# Patient Record
Sex: FEMALE | Race: WHITE | NOT HISPANIC OR LATINO | Employment: PART TIME | URBAN - METROPOLITAN AREA
[De-identification: names, ages, dates, MRNs, and addresses within clinical notes are randomized per-mention and may not be internally consistent; named-entity substitution may affect disease eponyms.]

---

## 2020-10-02 ENCOUNTER — TRANSCRIBE ORDERS (OUTPATIENT)
Dept: ADMINISTRATIVE | Facility: HOSPITAL | Age: 40
End: 2020-10-02

## 2020-10-02 DIAGNOSIS — N63.20 MASS OF LEFT BREAST: Primary | ICD-10-CM

## 2020-10-23 ENCOUNTER — HOSPITAL ENCOUNTER (OUTPATIENT)
Dept: ULTRASOUND IMAGING | Facility: HOSPITAL | Age: 40
Discharge: HOME | End: 2020-10-23

## 2020-10-23 ENCOUNTER — HOSPITAL ENCOUNTER (OUTPATIENT)
Dept: MAMMOGRAPHY | Facility: HOSPITAL | Age: 40
Discharge: HOME | End: 2020-10-23

## 2020-10-23 ENCOUNTER — TRANSCRIBE ORDERS (OUTPATIENT)
Dept: MAMMOGRAPHY | Facility: HOSPITAL | Age: 40
End: 2020-10-23

## 2020-10-23 DIAGNOSIS — R92.8 ABNORMAL MAMMOGRAM: Primary | ICD-10-CM

## 2020-10-23 DIAGNOSIS — N63.20 MASS OF LEFT BREAST: ICD-10-CM

## 2020-10-23 PROCEDURE — G0279 TOMOSYNTHESIS, MAMMO: HCPCS

## 2020-10-23 PROCEDURE — 77066 DX MAMMO INCL CAD BI: CPT

## 2020-10-23 PROCEDURE — 76642 ULTRASOUND BREAST LIMITED: CPT

## 2020-10-23 PROCEDURE — 76641 ULTRASOUND BREAST COMPLETE: CPT | Performed by: RADIOLOGY

## 2020-10-23 PROCEDURE — 77062 BREAST TOMOSYNTHESIS BI: CPT | Performed by: RADIOLOGY

## 2020-10-23 PROCEDURE — 77066 DX MAMMO INCL CAD BI: CPT | Performed by: RADIOLOGY

## 2020-11-20 ENCOUNTER — HOSPITAL ENCOUNTER (OUTPATIENT)
Dept: ULTRASOUND IMAGING | Facility: HOSPITAL | Age: 40
Discharge: HOME | End: 2020-11-20

## 2020-11-20 ENCOUNTER — HOSPITAL ENCOUNTER (OUTPATIENT)
Dept: MAMMOGRAPHY | Facility: HOSPITAL | Age: 40
Discharge: HOME | End: 2020-11-20

## 2020-11-20 DIAGNOSIS — R92.8 ABNORMAL MAMMOGRAM: ICD-10-CM

## 2020-11-20 PROCEDURE — 25010000003 LIDOCAINE 1 % SOLUTION: Performed by: RADIOLOGY

## 2020-11-20 PROCEDURE — A4648 IMPLANTABLE TISSUE MARKER: HCPCS

## 2020-11-20 PROCEDURE — 77065 DX MAMMO INCL CAD UNI: CPT | Performed by: RADIOLOGY

## 2020-11-20 PROCEDURE — 19083 BX BREAST 1ST LESION US IMAG: CPT | Performed by: RADIOLOGY

## 2020-11-20 PROCEDURE — 88305 TISSUE EXAM BY PATHOLOGIST: CPT | Performed by: NURSE PRACTITIONER

## 2020-11-20 RX ORDER — LIDOCAINE HYDROCHLORIDE 10 MG/ML
5 INJECTION, SOLUTION INFILTRATION; PERINEURAL ONCE
Status: DISCONTINUED | OUTPATIENT
Start: 2020-11-20 | End: 2021-05-10

## 2020-11-20 RX ORDER — LIDOCAINE HYDROCHLORIDE AND EPINEPHRINE 10; 10 MG/ML; UG/ML
10 INJECTION, SOLUTION INFILTRATION; PERINEURAL ONCE
Status: COMPLETED | OUTPATIENT
Start: 2020-11-20 | End: 2020-11-20

## 2020-11-20 RX ORDER — LIDOCAINE HYDROCHLORIDE 10 MG/ML
5 INJECTION, SOLUTION INFILTRATION; PERINEURAL ONCE
Status: COMPLETED | OUTPATIENT
Start: 2020-11-20 | End: 2020-11-20

## 2020-11-20 RX ADMIN — LIDOCAINE HYDROCHLORIDE,EPINEPHRINE BITARTRATE 1 ML: 10; .01 INJECTION, SOLUTION INFILTRATION; PERINEURAL at 15:30

## 2020-11-20 RX ADMIN — LIDOCAINE HYDROCHLORIDE 2 ML: 10 INJECTION, SOLUTION INFILTRATION; PERINEURAL at 15:30

## 2020-11-20 NOTE — PROGRESS NOTES
Alert and orientated. Denies discomfort. No active bleeding. Steri-strips not visualized, gauze dressing intact. Ice packs given. Verbalizes and demonstrates understanding of post-care instructions, written copy given.  Pt verbalized understanding.         Cigarettes

## 2020-11-23 LAB
CYTO UR: NORMAL
LAB AP CASE REPORT: NORMAL
LAB AP CLINICAL INFORMATION: NORMAL
LAB AP DIAGNOSIS COMMENT: NORMAL
PATH REPORT.FINAL DX SPEC: NORMAL
PATH REPORT.GROSS SPEC: NORMAL

## 2020-11-24 ENCOUNTER — TELEPHONE (OUTPATIENT)
Dept: MAMMOGRAPHY | Facility: HOSPITAL | Age: 40
End: 2020-11-24

## 2020-11-24 NOTE — TELEPHONE ENCOUNTER
Pt notified of pathology results and recommendations. Verbalizes understanding. Denies discomfort. Denies signs and symptoms of infection.     Pt desires to research surgeon choice. Pt is to call back with decision; an appointment will then be scheduled and she will be notified. Verbalizes understanding.

## 2020-11-25 ENCOUNTER — TELEPHONE (OUTPATIENT)
Dept: MAMMOGRAPHY | Facility: HOSPITAL | Age: 40
End: 2020-11-25

## 2020-11-25 NOTE — TELEPHONE ENCOUNTER
Pt called in, requests Dr LAWRENCE Green for surgical consult. Pt is aware [facility] office is closed until 11/30/20.  Pt will be notified when an appointment is scheduled. Verbalized understanding.

## 2020-11-30 ENCOUNTER — TELEPHONE (OUTPATIENT)
Dept: MAMMOGRAPHY | Facility: HOSPITAL | Age: 40
End: 2020-11-30

## 2020-11-30 NOTE — TELEPHONE ENCOUNTER
Pt notified of surgical consult appointment with Dr.KB Green on 12.10.20 @ 0900/0995. Pt given office contact & location information. Told to bring photo ID, list of prescription & OTC medications, insurance information, must wear a mask & come to visit alone unless assistance is needed. Encouraged pt to call back or contact surgeon's office with further questions. Pt verbalized understanding.

## 2020-12-28 ENCOUNTER — APPOINTMENT (OUTPATIENT)
Dept: PREADMISSION TESTING | Facility: HOSPITAL | Age: 40
End: 2020-12-28

## 2020-12-28 LAB — SARS-COV-2 RNA RESP QL NAA+PROBE: NOT DETECTED

## 2020-12-28 PROCEDURE — U0004 COV-19 TEST NON-CDC HGH THRU: HCPCS

## 2020-12-28 PROCEDURE — C9803 HOPD COVID-19 SPEC COLLECT: HCPCS

## 2020-12-30 ENCOUNTER — LAB REQUISITION (OUTPATIENT)
Dept: LAB | Facility: HOSPITAL | Age: 40
End: 2020-12-30

## 2020-12-30 DIAGNOSIS — N63.42 UNSPECIFIED LUMP IN LEFT BREAST, SUBAREOLAR: ICD-10-CM

## 2020-12-30 PROCEDURE — 88307 TISSUE EXAM BY PATHOLOGIST: CPT | Performed by: SURGERY

## 2021-01-04 LAB
CYTO UR: NORMAL
LAB AP CASE REPORT: NORMAL
LAB AP CLINICAL INFORMATION: NORMAL
PATH REPORT.FINAL DX SPEC: NORMAL
PATH REPORT.GROSS SPEC: NORMAL

## 2021-05-10 ENCOUNTER — OFFICE VISIT (OUTPATIENT)
Dept: INTERNAL MEDICINE | Facility: CLINIC | Age: 41
End: 2021-05-10

## 2021-05-10 VITALS
BODY MASS INDEX: 22.74 KG/M2 | HEIGHT: 60 IN | SYSTOLIC BLOOD PRESSURE: 108 MMHG | WEIGHT: 115.8 LBS | DIASTOLIC BLOOD PRESSURE: 68 MMHG | OXYGEN SATURATION: 99 % | HEART RATE: 50 BPM | TEMPERATURE: 97.7 F

## 2021-05-10 DIAGNOSIS — Z13.0 SCREENING FOR ENDOCRINE, METABOLIC AND IMMUNITY DISORDER: ICD-10-CM

## 2021-05-10 DIAGNOSIS — Z13.228 SCREENING FOR ENDOCRINE, METABOLIC AND IMMUNITY DISORDER: ICD-10-CM

## 2021-05-10 DIAGNOSIS — Z13.29 SCREENING FOR ENDOCRINE, METABOLIC AND IMMUNITY DISORDER: ICD-10-CM

## 2021-05-10 DIAGNOSIS — Z23 NEED FOR TDAP VACCINATION: ICD-10-CM

## 2021-05-10 DIAGNOSIS — D69.6 THROMBOCYTOPENIA (HCC): ICD-10-CM

## 2021-05-10 DIAGNOSIS — G89.29 CHRONIC LEFT HIP PAIN: ICD-10-CM

## 2021-05-10 DIAGNOSIS — R00.2 PALPITATIONS: ICD-10-CM

## 2021-05-10 DIAGNOSIS — Z76.89 ENCOUNTER TO ESTABLISH CARE: Primary | ICD-10-CM

## 2021-05-10 DIAGNOSIS — R00.1 BRADYCARDIA: ICD-10-CM

## 2021-05-10 DIAGNOSIS — M25.552 CHRONIC LEFT HIP PAIN: ICD-10-CM

## 2021-05-10 DIAGNOSIS — Z13.0 SCREENING FOR DISORDER OF BLOOD AND BLOOD-FORMING ORGANS: ICD-10-CM

## 2021-05-10 DIAGNOSIS — I34.1 MITRAL VALVE PROLAPSE: ICD-10-CM

## 2021-05-10 DIAGNOSIS — R53.83 FATIGUE, UNSPECIFIED TYPE: ICD-10-CM

## 2021-05-10 PROBLEM — F41.9 ANXIETY: Status: ACTIVE | Noted: 2021-05-10

## 2021-05-10 LAB
25(OH)D3+25(OH)D2 SERPL-MCNC: 44.2 NG/ML (ref 30–100)
ALBUMIN SERPL-MCNC: 5 G/DL (ref 3.5–5.2)
ALBUMIN/GLOB SERPL: 2.4 G/DL
ALP SERPL-CCNC: 89 U/L (ref 39–117)
ALT SERPL-CCNC: 13 U/L (ref 1–33)
AST SERPL-CCNC: 15 U/L (ref 1–32)
BASOPHILS # BLD AUTO: 0.01 10*3/MM3 (ref 0–0.2)
BASOPHILS NFR BLD AUTO: 0.2 % (ref 0–1.5)
BILIRUB SERPL-MCNC: 0.2 MG/DL (ref 0–1.2)
BUN SERPL-MCNC: 17 MG/DL (ref 6–20)
BUN/CREAT SERPL: 16.8 (ref 7–25)
CALCIUM SERPL-MCNC: 9.6 MG/DL (ref 8.6–10.5)
CHLORIDE SERPL-SCNC: 102 MMOL/L (ref 98–107)
CO2 SERPL-SCNC: 27.6 MMOL/L (ref 22–29)
CREAT SERPL-MCNC: 1.01 MG/DL (ref 0.57–1)
EOSINOPHIL # BLD AUTO: 0.08 10*3/MM3 (ref 0–0.4)
EOSINOPHIL NFR BLD AUTO: 1.5 % (ref 0.3–6.2)
ERYTHROCYTE [DISTWIDTH] IN BLOOD BY AUTOMATED COUNT: 14.2 % (ref 12.3–15.4)
GLOBULIN SER CALC-MCNC: 2.1 GM/DL
GLUCOSE SERPL-MCNC: 86 MG/DL (ref 65–99)
HCT VFR BLD AUTO: 36.8 % (ref 34–46.6)
HGB BLD-MCNC: 12 G/DL (ref 12–15.9)
IMM GRANULOCYTES # BLD AUTO: 0.01 10*3/MM3 (ref 0–0.05)
IMM GRANULOCYTES NFR BLD AUTO: 0.2 % (ref 0–0.5)
LYMPHOCYTES # BLD AUTO: 1.42 10*3/MM3 (ref 0.7–3.1)
LYMPHOCYTES NFR BLD AUTO: 27.2 % (ref 19.6–45.3)
MCH RBC QN AUTO: 27.1 PG (ref 26.6–33)
MCHC RBC AUTO-ENTMCNC: 32.6 G/DL (ref 31.5–35.7)
MCV RBC AUTO: 83.3 FL (ref 79–97)
MONOCYTES # BLD AUTO: 0.38 10*3/MM3 (ref 0.1–0.9)
MONOCYTES NFR BLD AUTO: 7.3 % (ref 5–12)
NEUTROPHILS # BLD AUTO: 3.33 10*3/MM3 (ref 1.7–7)
NEUTROPHILS NFR BLD AUTO: 63.6 % (ref 42.7–76)
NRBC BLD AUTO-RTO: 0 /100 WBC (ref 0–0.2)
PLATELET # BLD AUTO: 146 10*3/MM3 (ref 140–450)
POTASSIUM SERPL-SCNC: 3.8 MMOL/L (ref 3.5–5.2)
PROT SERPL-MCNC: 7.1 G/DL (ref 6–8.5)
RBC # BLD AUTO: 4.42 10*6/MM3 (ref 3.77–5.28)
SODIUM SERPL-SCNC: 138 MMOL/L (ref 136–145)
TSH SERPL DL<=0.005 MIU/L-ACNC: 1.33 UIU/ML (ref 0.27–4.2)
WBC # BLD AUTO: 5.23 10*3/MM3 (ref 3.4–10.8)

## 2021-05-10 PROCEDURE — 90715 TDAP VACCINE 7 YRS/> IM: CPT | Performed by: NURSE PRACTITIONER

## 2021-05-10 PROCEDURE — 99204 OFFICE O/P NEW MOD 45 MIN: CPT | Performed by: NURSE PRACTITIONER

## 2021-05-10 PROCEDURE — 90471 IMMUNIZATION ADMIN: CPT | Performed by: NURSE PRACTITIONER

## 2021-05-10 NOTE — PROGRESS NOTES
"  Date: 05/10/2021    Name: Oralia Persaud  : 1980    Chief Complaint:   Chief Complaint   Patient presents with   • [facility]       HPI:  Oralia Persaud is a 41 y.o. female presents to establish care.    She has been an established patient in this clinic in the past, most recently in 2016.  She has a history of mitral valve prolapse, palpitations.  Has been evaluated by cardiology in the past.  Initially seen by Dr. Bowles, who advised her she needed a pacemaker.  She requested a second opinion and was evaluated by 2 separate cardiologists, one of whom was Dr Davis.  She reports she was told she did not need a pacemaker, has not followed up with primary care or cardiologist since that time.  Over the past few months, has noted a feeling that her \"heart is struggling to stay in rhythm\" after exercise.  She has also been having left hip pain with running, her preferred exercise.  Due these symptoms, she has stopped exercising.  She has been feeling fatigued, overall.  Denies current chest pain, dyspnea, orthopnea, lower extremity swelling, headache, vision changes, dizziness, confusion, syncope.  No current palpitations.    Fatigue is constant.  She has a h/o thrombocytopenia.  No bleeding when she brushes her teeth, hematuria, blood in stool.  She does bruise easily, when on her feet all day her knees are bruised.  States this is normal for her. Denies weight change, temperature intolerance, hair/skin/nail changes, bowel habit changes, anxiety, sore throat, hoarseness.   Eats a healthy diet.  Works as a  at a [employer], part time.  Has 3 sons, aged 11-17.    Left hip pain was noted after a strenuous workout about a year ago, she does not recall a specific injury. Went to visit family the day after pain began, she rested the hip and pain slightly improved.  She has never had it evaluated, has been stretching the hip.  Pain elicited with treadmill, running.  Pain does not radiate into lower leg, " "knee.  Hip does pop with movement.         History:  LMP: 21  Last pap date: 2020  Abnormal pap? yes, with repeat testing normal.  : 3  Para: 3    Do you take any herbs or supplements that were not prescribed by a doctor? yes, MVN a couple of times a week, vit d daily except on days she takes MVN    Health Habits:  Dental Exam. up to date  Eye Exam. up to date, wears contacts.   Exercise: 0 times/week.  Current exercise activities include: none  History:    Past Medical History:   Diagnosis Date   • Mitral valve prolapse    • Thrombocytopenia (CMS/HCC)        Past Surgical History:   Procedure Laterality Date   • BREAST SURGERY         Family History   Problem Relation Age of Onset   • Hyperlipidemia Mother    • Diabetes Sister    • Diabetes Maternal Aunt        Social History     Socioeconomic History   • Marital status:      Spouse name: Not on file   • Number of children: Not on file   • Years of education: Not on file   • Highest education level: Not on file   Tobacco Use   • Smoking status: Never Smoker   • Smokeless tobacco: Never Used   Substance and Sexual Activity   • Alcohol use: No   • Drug use: No       No Known Allergies      Current Outpatient Medications:   •  Cholecalciferol (VITAMIN D) 1000 UNITS tablet, Take 1 tablet by mouth 2 (two) times a day., Disp: , Rfl:   No current facility-administered medications for this visit.    VS:  Vitals:    05/10/21 0828   BP: 108/68   Pulse: 50   Temp: 97.7 °F (36.5 °C)   TempSrc: Infrared   SpO2: 99%   Weight: 52.5 kg (115 lb 12.8 oz)   Height: 152.4 cm (60\")     Body mass index is 22.62 kg/m².    PE:  Physical Exam  Constitutional:       Appearance: She is well-developed. She is not ill-appearing.   HENT:      Head: Normocephalic.      Right Ear: Tympanic membrane, ear canal and external ear normal.      Left Ear: Tympanic membrane, ear canal and external ear normal.      Nose: Nose normal.      Mouth/Throat:      Mouth: Mucous membranes " are moist.      Pharynx: Oropharynx is clear. Uvula midline.   Eyes:      Extraocular Movements: Extraocular movements intact.      Conjunctiva/sclera: Conjunctivae normal.      Pupils: Pupils are equal, round, and reactive to light.   Neck:      Thyroid: No thyromegaly.      Vascular: No carotid bruit.   Cardiovascular:      Rate and Rhythm: Regular rhythm. Bradycardia present.      Pulses: Normal pulses.      Heart sounds: Normal heart sounds.   Pulmonary:      Effort: Pulmonary effort is normal.      Breath sounds: Normal breath sounds.   Abdominal:      General: Bowel sounds are normal. There is no distension or abdominal bruit.      Palpations: Abdomen is soft.      Tenderness: There is no abdominal tenderness.   Musculoskeletal:         General: No tenderness or deformity. Normal range of motion.      Cervical back: Full passive range of motion without pain, normal range of motion and neck supple.   Lymphadenopathy:      Cervical: No cervical adenopathy.   Skin:     General: Skin is warm.      Capillary Refill: Capillary refill takes less than 2 seconds.      Coloration: Skin is pale.      Findings: No bruising.   Neurological:      Mental Status: She is alert and oriented to person, place, and time.      Sensory: No sensory deficit.      Coordination: Coordination normal.      Gait: Gait normal.      Comments: CN II-XII normal   Psychiatric:         Attention and Perception: Attention normal.         Mood and Affect: Mood and affect normal.         Speech: Speech normal.         Behavior: Behavior normal.         Thought Content: Thought content normal.           Assessment/Plan:     1. Healthy female exam.  2. Patient Counseling: Including but not Limited to the following, when appropriate:  --Nutrition: Stressed importance of moderation in sodium/caffeine intake, saturated fat and cholesterol, caloric balance, sufficient intake of fresh fruits, vegetables, fiber, calcium, iron, and 1 mg of folate supplement  per day (for females capable of pregnancy).  --Exercise: Stressed the importance of regular exercise. Advised to continue to abstain from exercise until cardiology evaluation.    --Substance Abuse: Discussed cessation/primary prevention of tobacco, alcohol, or other drug use; driving or other dangerous activities under the influence; availability of treatment for abuse, as indicated based on social history.    --Sexuality: Discussed sexually transmitted diseases, partner selection, use of condoms, avoidance of unintended pregnancy  and contraceptive alternatives.   --Injury prevention: Discussed safety belts, safety helmets, smoke detector, smoking near bedding or upholstery.   --Dental health: Discussed importance of regular tooth brushing, flossing, and dental visits.  --Immunizations reviewed. She is not certain she wants to receive covid vaccine. TDaP given today.    3. Discussed the patient's BMI with her.  The BMI is in the acceptable range  4. Return for Annual.  5. Age-appropriate Screening Scheduled  6. There are no Patient Instructions on file for this visit.    Diagnoses and all orders for this visit:    1. Encounter to establish care (Primary)    2. Palpitations  -     Ambulatory Referral to Cardiology  - Discussed Holter monitor, patient states she has worn one in the past. She is not currently having feeling of abnormal heart rhythm as she is not exercising. No Holter monitor placed today.      3. Mitral valve prolapse  -     Ambulatory Referral to Cardiology    4. Bradycardia  -     Ambulatory Referral to Cardiology.  Bradycardia has been noted in the past.      5. Fatigue, unspecified type  -     TSH Rfx On Abnormal To Free T4  -     Vitamin D 25 Hydroxy    6. Thrombocytopenia (CMS/HCC)  -     CBC & Differential    7. Chronic left hip pain         - Discussed interventions, imaging to determine possible cause of hip pain. She prefers to see what cardiology evaluation entails before working up hip  pain.          - Advised to continue hip stretches.      8. Need for Tdap vaccination  -     Tdap Vaccine Greater Than or Equal To 8yo IM    9. Screening for disorder of blood and blood-forming organs  -     CBC & Differential    10. Screening for endocrine, metabolic and immunity disorder  -     Comprehensive Metabolic Panel      Return for Annual.

## 2021-06-23 ENCOUNTER — OFFICE VISIT (OUTPATIENT)
Dept: CARDIOLOGY | Facility: CLINIC | Age: 41
End: 2021-06-23

## 2021-06-23 VITALS
SYSTOLIC BLOOD PRESSURE: 118 MMHG | HEIGHT: 60 IN | OXYGEN SATURATION: 98 % | HEART RATE: 67 BPM | WEIGHT: 112 LBS | DIASTOLIC BLOOD PRESSURE: 70 MMHG | BODY MASS INDEX: 21.99 KG/M2

## 2021-06-23 DIAGNOSIS — R00.2 PALPITATIONS: Primary | ICD-10-CM

## 2021-06-23 PROCEDURE — 99202 OFFICE O/P NEW SF 15 MIN: CPT | Performed by: INTERNAL MEDICINE

## 2021-06-23 PROCEDURE — 93000 ELECTROCARDIOGRAM COMPLETE: CPT | Performed by: INTERNAL MEDICINE

## 2021-06-23 NOTE — PROGRESS NOTES
[facility] Cardiology  Consultation H&P  Oralia Persaud  1980  [address] [address]     VISIT DATE:  21    PCP: Krystal Zavala APRN  [address]  [place] [address]    IDENTIFICATION: A 41 y.o. female  from [place]    PROBLEM LIST:  1. Bradycardia:   a. , Holter per Bowles with nocturnal bradycardia with no pathologic AV block.   2. Valvular heart disease:   a.   echocardiogram with trivial MR, TR. Normal LVF.   3. Raynaud’’s syndrome.   4. G3, P3 with  .   5. Vitamin D deficiency.     CC:  Chief Complaint   Patient presents with   • Slow Heart Rate     Consult   • Palpitations       Allergies  No Known Allergies    Current Medications    Current Outpatient Medications:   •  Cholecalciferol (VITAMIN D) 1000 UNITS tablet, Take 2,000 Units by mouth Daily., Disp: , Rfl:      History of Present Illness   HPI  Oralia Persaud is a 41 y.o. year old female with the above mentioned PMH who presents for consult from [facility] for evaluation of palpitations  Patient returns in follow-up after 5-year hiatus from practice.  She states that she had discontinued exercise with a hip injury and with reinitiation noted that her irregular heartbeats had worsened and was having increased exercise intolerance.  She notes no presyncope chest discomfort.          ROS  Review of Systems   Constitutional: Negative for chills, fever, malaise/fatigue, night sweats, weight gain and weight loss.   HENT: Negative for hearing loss and nosebleeds.    Eyes: Negative for blurred vision, vision loss in left eye, vision loss in right eye, visual disturbance and visual halos.   Cardiovascular: Positive for palpitations. Negative for chest pain, claudication, cyanosis, dyspnea on exertion, irregular heartbeat, leg swelling, near-syncope, orthopnea, paroxysmal nocturnal dyspnea and syncope.   Respiratory: Negative for cough, hemoptysis, shortness  "of breath, snoring and wheezing.    Endocrine: Negative for cold intolerance, heat intolerance, polydipsia, polyphagia and polyuria.   Hematologic/Lymphatic: Negative for adenopathy and bleeding problem. Does not bruise/bleed easily.   Skin: Negative for dry skin, poor wound healing and rash.   Musculoskeletal: Negative for falls, joint pain, joint swelling, muscle cramps, muscle weakness, myalgias and neck pain.   Gastrointestinal: Negative for bloating, abdominal pain, change in bowel habit, bowel incontinence, constipation, diarrhea, dysphagia, excessive appetite, heartburn, hematemesis, hematochezia, jaundice, melena, nausea and vomiting.   Genitourinary: Negative for bladder incontinence, dysuria, flank pain, hematuria, hesitancy and nocturia.   Neurological: Negative for aphonia, excessive daytime sleepiness, dizziness, focal weakness, headaches, light-headedness, loss of balance, seizures, sensory change, tremors, vertigo and weakness.   Psychiatric/Behavioral: Negative for altered mental status, depression, memory loss, substance abuse and suicidal ideas. The patient is not nervous/anxious.    All other systems reviewed and are negative.      SOCIAL HX  Social History     Socioeconomic History   • Marital status:      Spouse name: Not on file   • Number of children: Not on file   • Years of education: Not on file   • Highest education level: Not on file   Tobacco Use   • Smoking status: Never Smoker   • Smokeless tobacco: Never Used   Substance and Sexual Activity   • Alcohol use: No   • Drug use: No       FAMILY HX  Family History   Problem Relation Age of Onset   • Hyperlipidemia Mother    • Diabetes Sister    • Diabetes Maternal Aunt        Vitals:    06/23/21 1347   BP: 118/70   BP Location: Right arm   Patient Position: Sitting   Pulse: 67   SpO2: 98%   Weight: 50.8 kg (112 lb)   Height: 152.4 cm (60\")     Body mass index is 21.87 kg/m².     PHYSICAL EXAMINATION:  Constitutional:       " Appearance: Healthy appearance. Not in distress.   Neck:      Vascular: No JVR. JVD normal.   Pulmonary:      Effort: Pulmonary effort is normal.      Breath sounds: Normal breath sounds. No wheezing. No rhonchi. No rales.   Chest:      Chest wall: Not tender to palpatation.   Cardiovascular:      PMI at left midclavicular line. Normal rate. Regular rhythm. Normal S1. Normal S2.      Murmurs: There is no murmur.      No gallop. No click. No rub.   Pulses:     Intact distal pulses.   Edema:     Peripheral edema absent.   Abdominal:      General: Bowel sounds are normal.      Palpations: Abdomen is soft.      Tenderness: There is no abdominal tenderness.   Musculoskeletal: Normal range of motion.         General: No tenderness. Skin:     General: Skin is warm and dry.   Neurological:      General: No focal deficit present.      Mental Status: Alert and oriented to person, place and time.         Diagnostic Data:    ECG 12 Lead    Date/Time: 6/23/2021 2:10 PM  Performed by: Sadiq Davis MD  Authorized by: Sadiq Davis MD   Previous ECG: no previous ECG available  Rhythm: sinus rhythm  Ectopy: atrial premature contractions  BPM: 58               Lab Results   Component Value Date    CHLPL 146 09/03/2015    TRIG 99 09/03/2015    HDL 58 09/03/2015     Lab Results   Component Value Date    GLUCOSE 83 09/03/2015    BUN 17 05/10/2021    CREATININE 1.01 (H) 05/10/2021     05/10/2021    K 3.8 05/10/2021     05/10/2021    CO2 27.6 05/10/2021     Lab Results   Component Value Date    HGBA1C 4.5 09/03/2015     Lab Results   Component Value Date    WBC 5.23 05/10/2021    HGB 12.0 05/10/2021    HCT 36.8 05/10/2021     05/10/2021       ASSESSMENT:   Diagnosis Plan   1. Palpitations         PLAN:  48-hour E patch and have encouraged patient exercise well patches in place.  We will document if there are any atrial and/or ventricular arrhythmias.  She has baseline PAC with pausing that may be culprit of her   hopefully benign symptoms        [facility]*, thank you for referring Ms. Persaud for evaluation.  I have forwarded my electronically generated recommendations to you for review.  Please do not hesitate to call with any questions.      Sadiq Davis MD, FACC

## 2021-07-02 ENCOUNTER — TELEPHONE (OUTPATIENT)
Dept: CARDIOLOGY | Facility: CLINIC | Age: 41
End: 2021-07-02

## 2021-07-02 NOTE — TELEPHONE ENCOUNTER
Spoke with patient and advised per  her recent holter exam was WNL  no further changes are needed to her current routine/regimen. Pt verbalized understanding

## 2023-10-20 ENCOUNTER — TRANSCRIBE ORDERS (OUTPATIENT)
Dept: ADMINISTRATIVE | Facility: HOSPITAL | Age: 43
End: 2023-10-20
Payer: COMMERCIAL

## 2023-10-20 DIAGNOSIS — N63.10 MASS OF RIGHT BREAST, UNSPECIFIED QUADRANT: Primary | ICD-10-CM

## 2023-12-08 ENCOUNTER — HOSPITAL ENCOUNTER (OUTPATIENT)
Dept: MAMMOGRAPHY | Facility: HOSPITAL | Age: 43
Discharge: HOME | End: 2023-12-08
Payer: COMMERCIAL

## 2023-12-08 ENCOUNTER — HOSPITAL ENCOUNTER (OUTPATIENT)
Dept: ULTRASOUND IMAGING | Facility: HOSPITAL | Age: 43
Discharge: HOME | End: 2023-12-08
Payer: COMMERCIAL

## 2023-12-08 DIAGNOSIS — N63.10 MASS OF RIGHT BREAST, UNSPECIFIED QUADRANT: ICD-10-CM

## 2023-12-08 PROCEDURE — G0279 TOMOSYNTHESIS, MAMMO: HCPCS

## 2023-12-08 PROCEDURE — 77066 DX MAMMO INCL CAD BI: CPT

## 2023-12-08 PROCEDURE — 76642 ULTRASOUND BREAST LIMITED: CPT

## 2024-08-06 ENCOUNTER — OFFICE VISIT (OUTPATIENT)
Dept: INTERNAL MEDICINE | Facility: CLINIC | Age: 44
End: 2024-08-06
Payer: COMMERCIAL

## 2024-08-06 VITALS
WEIGHT: 115 LBS | TEMPERATURE: 98.5 F | BODY MASS INDEX: 22.58 KG/M2 | DIASTOLIC BLOOD PRESSURE: 62 MMHG | OXYGEN SATURATION: 99 % | SYSTOLIC BLOOD PRESSURE: 100 MMHG | HEIGHT: 60 IN | HEART RATE: 51 BPM

## 2024-08-06 DIAGNOSIS — R42 DIZZINESS: ICD-10-CM

## 2024-08-06 DIAGNOSIS — Z86.2 HISTORY OF IRON DEFICIENCY ANEMIA: ICD-10-CM

## 2024-08-06 DIAGNOSIS — N92.6 ABNORMAL MENSTRUAL CYCLE: ICD-10-CM

## 2024-08-06 DIAGNOSIS — Z13.1 SCREENING FOR DIABETES MELLITUS: ICD-10-CM

## 2024-08-06 DIAGNOSIS — Z13.228 SCREENING FOR ENDOCRINE, METABOLIC AND IMMUNITY DISORDER: ICD-10-CM

## 2024-08-06 DIAGNOSIS — Z13.220 SCREENING FOR LIPID DISORDERS: ICD-10-CM

## 2024-08-06 DIAGNOSIS — R00.2 PALPITATIONS: ICD-10-CM

## 2024-08-06 DIAGNOSIS — Z13.0 SCREENING FOR ENDOCRINE, METABOLIC AND IMMUNITY DISORDER: ICD-10-CM

## 2024-08-06 DIAGNOSIS — Z00.00 ANNUAL PHYSICAL EXAM: Primary | ICD-10-CM

## 2024-08-06 DIAGNOSIS — Z13.0 SCREENING FOR DISORDER OF BLOOD AND BLOOD-FORMING ORGANS: ICD-10-CM

## 2024-08-06 DIAGNOSIS — D69.6 THROMBOCYTOPENIA: ICD-10-CM

## 2024-08-06 DIAGNOSIS — Z13.29 SCREENING FOR ENDOCRINE, METABOLIC AND IMMUNITY DISORDER: ICD-10-CM

## 2024-08-06 PROCEDURE — 99396 PREV VISIT EST AGE 40-64: CPT | Performed by: NURSE PRACTITIONER

## 2024-08-06 RX ORDER — CHOLECALCIFEROL (VITAMIN D3) 25 MCG
TABLET ORAL EVERY 24 HOURS
COMMUNITY

## 2024-08-06 NOTE — PROGRESS NOTES
Chief Complaint   Patient presents with    Annual Exam     History of Present Illness    Oralia Persaud is a 44 y.o. female and is here for a comprehensive physical exam.     The patient experienced an episode of dizziness in 06/2023, which persisted throughout the day. Upon retiring to bed, she experienced nausea and shakiness. She has a history of similar episodes approximately 10 years ago, which were managed with anxiety management. Her blood glucose levels at that time were consistently in the 70s, prompting her concern. Despite consuming food, she continued to feel unwell for the next 2 days, albeit less severe than those experienced with the dizziness. The dizziness eventually subsided, but recurred 1 to 2 weeks later, albeit not severe. Her blood glucose level was recorded at 99. Despite her efforts to reduce her carbohydrate intake, she consumed a sweet the previous night. She did not monitor her blood glucose levels between these episodes. Since 06/2023, she has not experienced any further episodes. She denies polydipsia, polyuria, or nocturnal urination. She has no history of head injuries, vision changes, slurred speech, confusion, or cognitive changes during these episodes. The dizziness does not occur with positional changes.    The patient has a history of anemia following pregnancy. Her menstrual cycles are typically heavy on the 1st or 2 days of her menstrual cycle, accompanied by clotting. If she does not engage in running or exercise during the initial days, her menstrual cycle remains regular. Her last menstrual cycle commenced on 07/06/2023, and 2 weeks later, she experienced a 2-day episode of heavy bleeding. She has not yet consulted her OB/GYN. She occasionally experiences hot flashes.    The patient has not consulted a cardiologist in a few years. She reports increased arrhythmia following heavy physical exercise or running, which resolves if she does not exercise. She denies any  abnormal bleeding or bruising.    The patient reports intermittent dark spots on her neck, typically under her ear, which have been present for a couple of years. She has been using a lotion, which provides some relief. She denies any pruritus.    FAMILY HISTORY  Her sister has diabetes.     History:  LMP: 24  Last pap date: fall of last year  Abnormal pap? yes, with repeat testing normal  : 3  Para: 3     Do you take any herbs or supplements that were not prescribed by a doctor? yes, vit d, collagen 5 days a week     Health Habits:  Dental Exam. up to date  Eye Exam. up to date, wears contacts.   Exercise: 3 times/week.  Current exercise activities include: treadmill, core workout    Health Maintenance   Topic Date Due    PAP SMEAR  2024 (Originally 2023)    COVID-19 Vaccine (2023- season) 10/26/2024 (Originally 2023)    INFLUENZA VACCINE  2025 (Originally 2024)    ANNUAL PHYSICAL  2025    MAMMOGRAM  2025    TDAP/TD VACCINES (2 - Td or Tdap) 05/10/2031    HEPATITIS C SCREENING  Completed    Pneumococcal Vaccine 0-64  Aged Out       PMH, PSH, SocHx, FamHx, Allergies, and Medications: Reviewed and updated in the Visit Navigator.     No Known Allergies  Past Medical History:   Diagnosis Date    Abnormal patient-activated cardiac event monitor     Anxiety     History of echocardiogram     History of Holter monitoring     48 hours    History of stress test     Mitral valve prolapse     Thrombocytopenia      Past Surgical History:   Procedure Laterality Date    BREAST SURGERY       SECTION  2007     Social History     Socioeconomic History    Marital status:    Tobacco Use    Smoking status: Never    Smokeless tobacco: Never   Vaping Use    Vaping status: Never Used   Substance and Sexual Activity    Alcohol use: No    Drug use: No    Sexual activity: Yes     Partners: Male     Birth control/protection: None, Vasectomy     Family History  "  Problem Relation Age of Onset    Hyperlipidemia Mother     Arthritis Mother     Arthritis Father     Diabetes Sister     Developmental Disability Sister     Developmental Disability Sister     Diabetes Maternal Aunt     Breast cancer Paternal Aunt 50    Cancer Paternal Aunt         Breast cancer    COPD Paternal Grandfather        Review of Systems  Review of Systems   All other systems reviewed and are negative.      Objective   /62   Pulse 51   Temp 98.5 °F (36.9 °C)   Ht 152.4 cm (60\")   Wt 52.2 kg (115 lb)   SpO2 99%   BMI 22.46 kg/m²   Body mass index is 22.46 kg/m².    Physical Exam  Constitutional:       Appearance: She is well-developed. She is not ill-appearing.   HENT:      Head: Normocephalic.      Right Ear: Tympanic membrane, ear canal and external ear normal.      Left Ear: Tympanic membrane, ear canal and external ear normal.      Nose: Nose normal.      Mouth/Throat:      Mouth: Mucous membranes are moist.      Pharynx: Oropharynx is clear. Uvula midline.   Eyes:      Extraocular Movements: Extraocular movements intact.      Conjunctiva/sclera: Conjunctivae normal.      Pupils: Pupils are equal, round, and reactive to light.   Neck:      Thyroid: No thyromegaly.   Cardiovascular:      Rate and Rhythm: Regular rhythm. Bradycardia present.      Pulses:           Radial pulses are 2+ on the right side and 2+ on the left side.        Dorsalis pedis pulses are 2+ on the right side and 2+ on the left side.      Heart sounds: Normal heart sounds.   Pulmonary:      Effort: Pulmonary effort is normal.      Breath sounds: Normal breath sounds.   Abdominal:      General: Bowel sounds are normal.      Palpations: Abdomen is soft.      Tenderness: There is no abdominal tenderness.   Musculoskeletal:         General: No tenderness or deformity. Normal range of motion.      Cervical back: Full passive range of motion without pain, normal range of motion and neck supple.      Right lower leg: No edema. "      Left lower leg: No edema.   Lymphadenopathy:      Cervical: No cervical adenopathy.   Skin:     General: Skin is warm.      Capillary Refill: Capillary refill takes less than 2 seconds.   Neurological:      Mental Status: She is alert and oriented to person, place, and time.      Sensory: No sensory deficit.      Coordination: Coordination normal.      Gait: Gait normal.      Comments: CN II-XII normal   Psychiatric:         Attention and Perception: Attention normal.         Mood and Affect: Mood and affect normal.         Speech: Speech normal.         Behavior: Behavior normal.         Thought Content: Thought content normal.           Assessment & Plan   1. Healthy female exam.    2. Patient Counseling: Including but not Limited to the following, when appropriate:  --Nutrition: Stressed importance of moderation in sodium/caffeine intake, saturated fat and cholesterol, caloric balance, sufficient intake of fresh fruits, vegetables, fiber, calcium, iron, and 1 mg of folate supplement per day (for females capable of pregnancy).  --Exercise: Stressed the importance of regular exercise.   --Substance Abuse: Discussed cessation/primary prevention of tobacco, alcohol, or other drug use; driving or other dangerous activities under the influence; availability of treatment for abuse, as indicated based on social history.    --Sexuality: Discussed sexually transmitted diseases, partner selection, use of condoms, avoidance of unintended pregnancy  and contraceptive alternatives.   --Injury prevention: Discussed safety belts, safety helmets, smoke detector, smoking near bedding or upholstery.   --Dental health: Discussed importance of regular tooth brushing, flossing, and dental visits.  --Immunizations reviewed.  3. Discussed the patient's BMI with her.  The BMI is in the acceptable range  4. Return in about 1 year (around 8/6/2025) for Annual.  5. Age-appropriate Screening Scheduled    Assessment & Plan     Diagnoses  and all orders for this visit:    1. Annual physical exam (Primary)    2. Palpitations  -     Ambulatory Referral to Cardiology  -     CBC & Differential  -     Comprehensive Metabolic Panel  -     Hemoglobin A1c  -     T4, Free  -     TSH    3. Dizziness  -     CBC & Differential    4. Abnormal menstrual cycle  -     CBC & Differential  -     T4, Free  -     TSH    5. Thrombocytopenia  -     CBC & Differential    6. Screening for lipid disorders  -     Lipid Panel    7. Screening for endocrine, metabolic and immunity disorder  -     Comprehensive Metabolic Panel    8. Screening for disorder of blood and blood-forming organs  -     CBC & Differential  -     Iron and TIBC  -     Vitamin B12  -     Folate    9. Screening for diabetes mellitus  -     Hemoglobin A1c    10. BMI 22.0-22.9, adult  -     Vitamin D,25-Hydroxy    11. History of iron deficiency anemia  -     Iron and TIBC

## 2024-08-07 LAB
25(OH)D3+25(OH)D2 SERPL-MCNC: 30.8 NG/ML (ref 30–100)
ALBUMIN SERPL-MCNC: 4.5 G/DL (ref 3.5–5.2)
ALBUMIN/GLOB SERPL: 2.5 G/DL
ALP SERPL-CCNC: 83 U/L (ref 39–117)
ALT SERPL-CCNC: 11 U/L (ref 1–33)
AST SERPL-CCNC: 21 U/L (ref 1–32)
BASOPHILS # BLD AUTO: 0.02 10*3/MM3 (ref 0–0.2)
BASOPHILS NFR BLD AUTO: 0.3 % (ref 0–1.5)
BILIRUB SERPL-MCNC: 0.5 MG/DL (ref 0–1.2)
BUN SERPL-MCNC: 22 MG/DL (ref 6–20)
BUN/CREAT SERPL: 22.4 (ref 7–25)
CALCIUM SERPL-MCNC: 9.1 MG/DL (ref 8.6–10.5)
CHLORIDE SERPL-SCNC: 105 MMOL/L (ref 98–107)
CHOLEST SERPL-MCNC: 157 MG/DL (ref 0–200)
CO2 SERPL-SCNC: 25.1 MMOL/L (ref 22–29)
CREAT SERPL-MCNC: 0.98 MG/DL (ref 0.57–1)
EGFRCR SERPLBLD CKD-EPI 2021: 73.1 ML/MIN/1.73
EOSINOPHIL # BLD AUTO: 0.07 10*3/MM3 (ref 0–0.4)
EOSINOPHIL NFR BLD AUTO: 1.2 % (ref 0.3–6.2)
ERYTHROCYTE [DISTWIDTH] IN BLOOD BY AUTOMATED COUNT: 15.8 % (ref 12.3–15.4)
FOLATE SERPL-MCNC: 15.3 NG/ML (ref 4.78–24.2)
GLOBULIN SER CALC-MCNC: 1.8 GM/DL
GLUCOSE SERPL-MCNC: 88 MG/DL (ref 65–99)
HBA1C MFR BLD: 5.2 % (ref 4.8–5.6)
HCT VFR BLD AUTO: 39.4 % (ref 34–46.6)
HDLC SERPL-MCNC: 56 MG/DL (ref 40–60)
HGB BLD-MCNC: 12.5 G/DL (ref 12–15.9)
IMM GRANULOCYTES # BLD AUTO: 0.01 10*3/MM3 (ref 0–0.05)
IMM GRANULOCYTES NFR BLD AUTO: 0.2 % (ref 0–0.5)
IRON SATN MFR SERPL: 10 % (ref 20–50)
IRON SERPL-MCNC: 53 MCG/DL (ref 37–145)
LDLC SERPL CALC-MCNC: 86 MG/DL (ref 0–100)
LYMPHOCYTES # BLD AUTO: 1.97 10*3/MM3 (ref 0.7–3.1)
LYMPHOCYTES NFR BLD AUTO: 32.5 % (ref 19.6–45.3)
MCH RBC QN AUTO: 26.6 PG (ref 26.6–33)
MCHC RBC AUTO-ENTMCNC: 31.7 G/DL (ref 31.5–35.7)
MCV RBC AUTO: 83.8 FL (ref 79–97)
MONOCYTES # BLD AUTO: 0.46 10*3/MM3 (ref 0.1–0.9)
MONOCYTES NFR BLD AUTO: 7.6 % (ref 5–12)
NEUTROPHILS # BLD AUTO: 3.54 10*3/MM3 (ref 1.7–7)
NEUTROPHILS NFR BLD AUTO: 58.2 % (ref 42.7–76)
PLATELET # BLD AUTO: 177 10*3/MM3 (ref 140–450)
POTASSIUM SERPL-SCNC: 4.4 MMOL/L (ref 3.5–5.2)
PROT SERPL-MCNC: 6.3 G/DL (ref 6–8.5)
RBC # BLD AUTO: 4.7 10*6/MM3 (ref 3.77–5.28)
SODIUM SERPL-SCNC: 140 MMOL/L (ref 136–145)
T4 FREE SERPL-MCNC: 1.12 NG/DL (ref 0.92–1.68)
TIBC SERPL-MCNC: 513 MCG/DL
TRIGL SERPL-MCNC: 79 MG/DL (ref 0–150)
TSH SERPL DL<=0.005 MIU/L-ACNC: 1.35 UIU/ML (ref 0.27–4.2)
UIBC SERPL-MCNC: 460 MCG/DL (ref 112–346)
VIT B12 SERPL-MCNC: 399 PG/ML (ref 211–946)
VLDLC SERPL CALC-MCNC: 15 MG/DL (ref 5–40)
WBC # BLD AUTO: 6.07 10*3/MM3 (ref 3.4–10.8)

## 2024-08-07 NOTE — PROGRESS NOTES
Iron saturation, iron binding capacity low.  Recommend taking an over-the-counter iron supplement with 65 mg of elemental iron daily.  Taking it with vitamin C will enhance absorption.  Iron can cause GI upset, constipation.  Take with food.  Vitamin D is within normal range, but on the low side.  Recommend taking an over-the-counter vitamin D3 supplement, 2000 IU every day.  Take with calcium 10 hands absorption.  Remainder of labs are normal.

## 2024-08-25 NOTE — PROGRESS NOTES
[facility] Cardiology  Consultation H&P  Oralia Persaud  1980  [address]  [facility] [address]     VISIT DATE:  24    PCP: Krystal Zavala APRN  [address] [facility] [address]  [facility] [address]    IDENTIFICATION: A 44 y.o. female  from [facility] 2021    PROBLEM LIST:  Bradycardia:   , Holter per Bowles with nocturnal bradycardia with no pathologic AV block.    48 hr Holter: SR, 35-, >600 pauses up to 2.8 secs  Valvular heart disease:     echocardiogram with trivial MR, TR. Normal LVF.   Lipid status    [id number]  Raynaud’’s syndrome.   G3, P3 with  .   Vitamin D deficiency.   Surgical history:  breast surgery,       CC:  Chief Complaint   Patient presents with    Palpitations       Allergies  No Known Allergies    Current Medications  Current Outpatient Medications   Medication Instructions    cholecalciferol (Vitamin D) 25 MCG (1000 UT) tablet Every 24 Hours    COLLAGEN PO Oral    ferrous sulfate 325 mg, Oral, Daily With Breakfast    vitamin C (ASCORBIC ACID) 250 MG tablet 250 mg, Oral, Daily        History of Present Illness   HPI  Oralia Persaud is a 44 y.o. year old female with the above mentioned PMH who presents for consult from Krystal Zavala APRN for evaluation of palpitations  And lost to follow-up and seen me 3 years previous.  She notes that she was attempting to try to exercise and running and she would have sustained tachypalpitations the evening after an event.  Otherwise otherwise she continues to work and does vigorous activity he does not have any exercise-induced issues nor exercise intolerance.      Pt denies any chest pain, dyspnea at rest, dyspnea on exertion, orthopnea, PND,  lower extremity edema, or claudication. Pt denies history of CHF, DVT, PE, MI, CVA, TIA, or rheumatic fever.       ROS  Review of Systems   Cardiovascular:  Positive for palpitations.       SOCIAL HX  Social  "History     Socioeconomic History    Marital status:    Tobacco Use    Smoking status: Never     Passive exposure: Never    Smokeless tobacco: Never   Vaping Use    Vaping status: Never Used   Substance and Sexual Activity    Alcohol use: No    Drug use: No    Sexual activity: Yes     Partners: Male     Birth control/protection: None, Vasectomy       FAMILY HX  Family History   Problem Relation Age of Onset    Hyperlipidemia Mother     Arthritis Mother     Arthritis Father     Diabetes Sister     Developmental Disability Sister     Developmental Disability Sister     Diabetes Maternal Aunt     Breast cancer Paternal Aunt 50    Cancer Paternal Aunt         Breast cancer    COPD Paternal Grandfather        Vitals:    08/26/24 1450   BP: 98/62   BP Location: Right arm   Patient Position: Sitting   Cuff Size: Adult   Pulse: 54   SpO2: 99%   Weight: 51.5 kg (113 lb 9.6 oz)   Height: 152.4 cm (60\")     Body mass index is 22.19 kg/m².     PHYSICAL EXAMINATION:  Constitutional:       Appearance: Healthy appearance. Not in distress.   Neck:      Vascular: No JVR. JVD normal.   Pulmonary:      Effort: Pulmonary effort is normal.      Breath sounds: Normal breath sounds. No wheezing. No rhonchi. No rales.   Chest:      Chest wall: Not tender to palpatation.   Cardiovascular:      PMI at left midclavicular line. Normal rate. Regular rhythm. Normal S1. Normal S2.       Murmurs: There is no murmur.      No gallop.  No click. No rub.   Pulses:     Intact distal pulses.   Edema:     Peripheral edema absent.   Abdominal:      General: Bowel sounds are normal.      Palpations: Abdomen is soft.      Tenderness: There is no abdominal tenderness.   Musculoskeletal: Normal range of motion.         General: No tenderness. Skin:     General: Skin is warm and dry.   Neurological:      General: No focal deficit present.      Mental Status: Alert and oriented to person, place and time.         Diagnostic Data:  Procedures  Lab Results "   Component Value Date    CHLPL 157 08/06/2024    TRIG 79 08/06/2024    HDL 56 08/06/2024    LDL 86 08/06/2024      Lab Results   Component Value Date    GLUCOSE 88 08/06/2024    CALCIUM 9.1 08/06/2024     08/06/2024    K 4.4 08/06/2024    CO2 25.1 08/06/2024     08/06/2024    BUN 22 (H) 08/06/2024    CREATININE 0.98 08/06/2024    EGFRRESULT 73.1 08/06/2024    EGFR 77 09/03/2015    BCR 22.4 08/06/2024    ANIONGAP 7 09/03/2015      Lab Results   Component Value Date    WBC 6.07 08/06/2024    HGB 12.5 08/06/2024    HCT 39.4 08/06/2024    MCV 83.8 08/06/2024     08/06/2024     Lab Results   Component Value Date    HGBA1C 5.20 08/06/2024      Lab Results   Component Value Date    TSH 1.350 08/06/2024          Advance Care Planning   ACP discussion was held with the patient during this visit. Patient does not have an advance directive, information provided.         ASSESSMENT:     Diagnosis Plan   1. Palpitations            PLAN:    Palpitations I would document EKG stress test    Atypical chest pain -commend an attempt with Maalox and/or Mylanta comparable to GI cocktail to see if she has relief with such          Krystal Zavala APRN, thank you for referring Ms. Persaud for evaluation.  I have forwarded my electronically generated recommendations to you for review.  Please do not hesitate to call with any questions.      Sadiq Davis MD, FACC

## 2024-08-26 ENCOUNTER — OFFICE VISIT (OUTPATIENT)
Dept: CARDIOLOGY | Facility: CLINIC | Age: 44
End: 2024-08-26
Payer: COMMERCIAL

## 2024-08-26 VITALS
OXYGEN SATURATION: 99 % | HEART RATE: 54 BPM | SYSTOLIC BLOOD PRESSURE: 98 MMHG | WEIGHT: 113.6 LBS | HEIGHT: 60 IN | BODY MASS INDEX: 22.3 KG/M2 | DIASTOLIC BLOOD PRESSURE: 62 MMHG

## 2024-08-26 DIAGNOSIS — R00.2 PALPITATIONS: Primary | ICD-10-CM

## 2024-08-26 PROCEDURE — 99214 OFFICE O/P EST MOD 30 MIN: CPT | Performed by: INTERNAL MEDICINE

## 2024-08-26 RX ORDER — FERROUS SULFATE 325(65) MG
325 TABLET ORAL
COMMUNITY